# Patient Record
Sex: FEMALE | Race: WHITE | Employment: FULL TIME | URBAN - METROPOLITAN AREA
[De-identification: names, ages, dates, MRNs, and addresses within clinical notes are randomized per-mention and may not be internally consistent; named-entity substitution may affect disease eponyms.]

---

## 2021-01-12 ENCOUNTER — HOSPITAL ENCOUNTER (EMERGENCY)
Age: 41
Discharge: HOME | End: 2021-01-12
Attending: EMERGENCY MEDICINE
Payer: COMMERCIAL

## 2021-01-12 VITALS
TEMPERATURE: 97.5 F | OXYGEN SATURATION: 98 % | WEIGHT: 110 LBS | HEIGHT: 61 IN | SYSTOLIC BLOOD PRESSURE: 167 MMHG | HEART RATE: 108 BPM | DIASTOLIC BLOOD PRESSURE: 100 MMHG | RESPIRATION RATE: 16 BRPM | BODY MASS INDEX: 20.77 KG/M2

## 2021-01-12 DIAGNOSIS — L03.012 PARONYCHIA OF FINGER OF LEFT HAND: Primary | ICD-10-CM

## 2021-01-12 PROCEDURE — 99283 EMERGENCY DEPT VISIT LOW MDM: CPT

## 2021-01-12 PROCEDURE — 6370000000 HC RX 637 (ALT 250 FOR IP): Performed by: EMERGENCY MEDICINE

## 2021-01-12 PROCEDURE — 10060 I&D ABSCESS SIMPLE/SINGLE: CPT

## 2021-01-12 RX ORDER — SULFAMETHOXAZOLE AND TRIMETHOPRIM 800; 160 MG/1; MG/1
1 TABLET ORAL ONCE
Status: COMPLETED | OUTPATIENT
Start: 2021-01-12 | End: 2021-01-12

## 2021-01-12 RX ORDER — SULFAMETHOXAZOLE AND TRIMETHOPRIM 800; 160 MG/1; MG/1
1 TABLET ORAL 2 TIMES DAILY
Qty: 14 TABLET | Refills: 0 | Status: SHIPPED | OUTPATIENT
Start: 2021-01-12 | End: 2021-01-19

## 2021-01-12 RX ADMIN — SULFAMETHOXAZOLE AND TRIMETHOPRIM 1 TABLET: 800; 160 TABLET ORAL at 18:58

## 2021-01-12 ASSESSMENT — PAIN DESCRIPTION - LOCATION: LOCATION: FINGER (COMMENT WHICH ONE)

## 2021-01-12 ASSESSMENT — PAIN DESCRIPTION - ORIENTATION: ORIENTATION: LEFT

## 2021-01-12 ASSESSMENT — PAIN DESCRIPTION - PAIN TYPE: TYPE: ACUTE PAIN

## 2021-01-12 NOTE — ED PROVIDER NOTES
 Drug use: Not Currently    Sexual activity: Yes     Partners: Male   Lifestyle    Physical activity     Days per week: Not on file     Minutes per session: Not on file    Stress: Not on file   Relationships    Social connections     Talks on phone: Not on file     Gets together: Not on file     Attends [employer] service: Not on file     Active member of club or organization: Not on file     Attends meetings of clubs or organizations: Not on file     Relationship status: Not on file    Intimate partner violence     Fear of current or ex partner: Not on file     Emotionally abused: Not on file     Physically abused: Not on file     Forced sexual activity: Not on file   Other Topics Concern    Not on file   Social History Narrative    Not on file       Medication History  Current Facility-Administered Medications   Medication Dose Route Frequency Provider Last Rate Last Admin    sulfamethoxazole-trimethoprim (BACTRIM DS;SEPTRA DS) 800-160 MG per tablet 1 tablet  1 tablet Oral Once Triny Siegel MD         Current Outpatient Medications   Medication Sig Dispense Refill    sulfamethoxazole-trimethoprim (BACTRIM DS) 800-160 MG per tablet Take 1 tablet by mouth 2 times daily for 7 days 14 tablet 0       Allergies  Allergies   Allergen Reactions    Asa [Aspirin] Hives         PHYSICAL EXAM  Nursing Notes Reviewed     VITAL SIGNS: BP (!) 167/100   Pulse 108   Temp 97.5 °F (36.4 °C)   Resp 16   Ht 5' 1\" (1.549 m)   Wt 110 lb (49.9 kg)   LMP 12/15/2020   SpO2 98%   BMI 20.78 kg/m²    Constitutional: Well-developed, well-nourished and in no acute distress  Head: Atraumatic, normocephalic. Eyes: PERRL. EOMI. no scleral icterus. Neck/Lymphatics: Supple, no JVD, No lymphadenopathy  Cardiovascular: Regular rate and normal rhythm. No murmur or gallop noted. No JVD. Peripheral Vascular: Pulses +2 and equal on both radial arteries, and both dorsalis pedis arteries bilaterally.   Respiratory: Clear to auscultation bilaterally, no increased work of breathing or respiratory distress noted. No costal retractions. Speaking full sentences comfortably. Abdomen: Abdomen soft and without distention. No rebound or guarding noted. No abdominal bruit or pulsatile abdominal mass. Musculoskeletal: No lower extremity edema, no calf tenderness or Homans' sign noted. Has redness and swelling around the nailbed of the fourth digit on the left hand. Integument:  Warm, Dry, Intact, appropriate skin turgor with no mottling  Neurologic:  Alert & oriented x3 , no gross or focal neurologic deficits noted, no ataxia. Cranial nerves II through XII are intact. MEDICAL DECISION MAKING/ ASSESSMENT AND PLAN    Patient presents for evaluation of redness and swelling of the index finger the left hand which clinically is a paronychia. Offer was made to both incise and drain it either with a digital block without, patient opted for without digital block. 11 blade scalpel was inserted into a sterilized nail bed along the cuticle, into the point of the paronychia with almost immediate release of purulent discharge. Patient had almost immediate relief as well. Patient tolerated procedure well. She was given Bactrim here, as well as a 7-day prescription for Bactrim to take at home. Patient states her pain was completely relieved after the procedure. Encouraged to use nonsteroidals as needed or Tylenol should the pain return. And to take antibiotics until they have been completed    DIAGNOSIS    1.  Paronychia of finger of left hand            CAROLINE LOZANO MD, CPE, [facility]  Emergency Medicine, Medical Toxicology and [address] [place] [address] [facility]                 Paresh Wilson MD  01/12/21 1024